# Patient Record
(demographics unavailable — no encounter records)

---

## 2024-12-18 NOTE — PHYSICAL EXAM
[Normal Coordination] : normal coordination [Normal Sensation] : normal sensation [Normal Mood and Affect] : normal mood and affect [Oriented] : oriented [Able to Communicate] : able to communicate [Well Developed] : well developed [Well Nourished] : well nourished [4___] : hamstring 4[unfilled]/5 [Right] : right knee [Lateral] : lateral [[place]] : skyline [AP Standing] : anteroposterior standing [There are no fractures, subluxations or dislocations. No significant abnormalities are seen] : There are no fractures, subluxations or dislocations. No significant abnormalities are seen [] : no extensor lag [TWNoteComboBox7] : flexion 110 degrees

## 2024-12-18 NOTE — HISTORY OF PRESENT ILLNESS
[de-identified] : Patient reports medial knee pain following a challenging hike about 3 weeks ago when she slipped. She is walking with a limp and has been using a brace, ice and takes Aleve without improvement.

## 2024-12-18 NOTE — DISCUSSION/SUMMARY
[Medication Risks Reviewed] : Medication risks reviewed [de-identified] : We discussed formal PT, a home exercise program, ice therapy and the role of NSAIDS for the pain. These modalities will improve the patient's functionality in their activities of daily life.  Risks, benefits and contraindications were discussed.  The patient will follow up in 6 weeks or sooner as needed.

## 2025-01-23 NOTE — PHYSICAL EXAM
[Normal Coordination] : normal coordination [Normal Sensation] : normal sensation [Normal Mood and Affect] : normal mood and affect [Oriented] : oriented [Able to Communicate] : able to communicate [Well Developed] : well developed [Well Nourished] : well nourished [4___] : hamstring 4[unfilled]/5 [Right] : right knee [Lateral] : lateral [[place]] : skyline [AP Standing] : anteroposterior standing [There are no fractures, subluxations or dislocations. No significant abnormalities are seen] : There are no fractures, subluxations or dislocations. No significant abnormalities are seen [] : no extensor lag [TWNoteComboBox7] : flexion 110 degrees

## 2025-01-23 NOTE — DISCUSSION/SUMMARY
[de-identified] : We discussed formal PT, a home exercise program, ice therapy and the role of NSAIDS for the pain. These modalities will improve the patient's functionality in their activities of daily life.  Risks, benefits and contraindications were discussed.  The patient will follow up in 6 weeks or sooner as needed.

## 2025-01-23 NOTE — DATA REVIEWED
[MRI] : MRI [Right] : of the right [Knee] : knee [Report was reviewed and noted in the chart] : The report was reviewed and noted in the chart [I reviewed the films/CD] : I reviewed the films/CD [FreeTextEntry1] : 1. 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 2. Nondisplaced fracture at the medial femoral condyle, with associated bone bruising. 3. Central full-thickness tear of the anterior cruciate ligament. 4. Sprain of the patellar retinaculum. 5. Cartilaginous degenerative changes at the medial and lateral compartments. 6. Suprapatellar joint effusion. 7. Baker's cyst. 8. Mild infrapatellar bursitis.

## 2025-01-23 NOTE — HISTORY OF PRESENT ILLNESS
[de-identified] : Patient reports significant improvement since onset. She has been unable to take the Celebrex due to an unrelated surgical procedure. She has been icing, elevating, resting and using Tylenol since onset.

## 2025-02-26 NOTE — DISCUSSION/SUMMARY
[de-identified] : We discussed formal PT, a home exercise program, ice therapy and the role of NSAIDS for the pain. These modalities will improve the patient's functionality in their activities of daily life.  Risks, benefits and contraindications were discussed.  The patient will follow up in 6 weeks or sooner as needed.

## 2025-02-26 NOTE — HISTORY OF PRESENT ILLNESS
[de-identified] : Patient reports continued improvement since onset. She has developed some lower back pain due to an antalgic gait.

## 2025-02-26 NOTE — PHYSICAL EXAM
[Normal Coordination] : normal coordination [Normal Sensation] : normal sensation [Normal Mood and Affect] : normal mood and affect [Oriented] : oriented [Able to Communicate] : able to communicate [Well Developed] : well developed [Well Nourished] : well nourished [4___] : hamstring 4[unfilled]/5 [Right] : right knee [Lateral] : lateral [[place]] : skyline [AP Standing] : anteroposterior standing [There are no fractures, subluxations or dislocations. No significant abnormalities are seen] : There are no fractures, subluxations or dislocations. No significant abnormalities are seen [] : no extensor lag [TWNoteComboBox7] : flexion 110 degrees

## 2025-06-09 NOTE — PHYSICAL EXAM
[NL (0)] : extension 0 degrees [4___] : quadriceps 4[unfilled]/5 [Left] : left knee [AP] : anteroposterior [Lateral] : lateral [The fracture is in acceptable alignment. There is progression in healing seen] : The fracture is in acceptable alignment. There is progression in healing seen [FreeTextEntry9] : well aligned healing comminuted patella fracture with bony callous formation [] : non-antalgic

## 2025-06-09 NOTE — DISCUSSION/SUMMARY
[de-identified] : We discussed continued bracing in dain knee brace for the next two weeks, repeat x-ray to determine transition to hinged brace in 2 weeks. We discussed ice therapy and the role of NSAIDS for the pain. These modalities will improve the patient's functionality in their activities of daily life.  Risks, benefits and contraindications were discussed.  The patient will follow up in 2 weeks or sooner as needed. Patient given paperwork for temporary handicapped parking pass today

## 2025-06-09 NOTE — HISTORY OF PRESENT ILLNESS
[de-identified] : Patient is 2 weeks s/p fracture, utilizing a cane for ambulation and presents in a knee dain brace

## 2025-06-25 NOTE — DISCUSSION/SUMMARY
[de-identified] : We discussed discontinuing use of knee immobilizer. She will transition to knee sleeve. We discussed formal PT, a home exercise program, ice therapy and the role of NSAIDS for the pain. These modalities will improve the patient's functionality in their activities of daily life.  Risks, benefits and contraindications were discussed.  The patient will follow up in 2 weeks or sooner as needed.

## 2025-06-25 NOTE — PHYSICAL EXAM
[NL (0)] : extension 0 degrees [4___] : quadriceps 4[unfilled]/5 [Left] : left knee [AP] : anteroposterior [Lateral] : lateral [The fracture is in acceptable alignment. There is progression in healing seen] : The fracture is in acceptable alignment. There is progression in healing seen [] : non-antalgic [FreeTextEntry9] : well aligned healing comminuted patella fracture with bony callous formation, improved since prior [TWNoteComboBox7] : flexion 70 degrees

## 2025-07-02 NOTE — DISCUSSION/SUMMARY
[Medication Risks Reviewed] : Medication risks reviewed [Surgical risks reviewed] : Surgical risks reviewed [de-identified] : The risks of the procedure, including but not limited to infection, bleeding, anesthetic complication, neurovascular injury and the possibility of subsequent surgery were discussed; the benefits, non-operative alternatives and expectations of the proposed procedure were also discussed. Post-operative management, the procedure itself and the expected recovery period were discussed at length with the patient. The need for post-operative physical therapy and home exercise regimen, possible bracing and medication management were also discussed. Informed consent was obtained.

## 2025-07-02 NOTE — HISTORY OF PRESENT ILLNESS
[de-identified] : Patient reports a slip and fall at home on a wet floor yesterday. She was evaluated in the ER where xrays were performed and she was placed in a knee immobilizer.

## 2025-07-02 NOTE — PHYSICAL EXAM
[NL (0)] : extension 0 degrees [4___] : quadriceps 4[unfilled]/5 [Left] : left knee [AP] : anteroposterior [Lateral] : lateral [The fracture is in acceptable alignment. There is progression in healing seen] : The fracture is in acceptable alignment. There is progression in healing seen [TWNoteComboBox7] : flexion 70 degrees [Outside films reviewed] : Outside films reviewed [] : no extensor lag [de-identified] : deferred due to known fracture [FreeTextEntry9] : displaced transverse patella fracture

## 2025-07-07 NOTE — HISTORY OF PRESENT ILLNESS
[de-identified] : Lt knee PO, patient reports doing much better, patient reports she took oxy 5 mg every 4 hours, then stopped taking it. 3 days after surgery, also icing every 20 minutes for the first 48 hours, still taking anti-inflammatory. swelling has gone down a lot and lots of improvement, feeling more Mobil. did use arnica on the bruising.

## 2025-07-07 NOTE — PHYSICAL EXAM
[NL (0)] : extension 0 degrees [4___] : quadriceps 4[unfilled]/5 [Left] : left knee [AP] : anteroposterior [Lateral] : lateral [Outside films reviewed] : Outside films reviewed [FreeTextEntry9] : displaced transverse patella fracture [] : not able to do straight leg raise [TWNoteComboBox7] : flexion 30 degrees

## 2025-07-07 NOTE — DISCUSSION/SUMMARY
[de-identified] : dressing changed, wounds clean and dry I reviewed all diagnostic and physical findings, the anatomy and pathology were discussed and the patient understands. All questions were answered and the patient left pleased. asa 81mg f/u Jacqui

## 2025-07-14 NOTE — DISCUSSION/SUMMARY
[Medication Risks Reviewed] : Medication risks reviewed [de-identified] : We discussed formal PT, a home exercise program, ice therapy and the role of NSAIDS for the pain. These modalities will improve the patient's functionality in their activities of daily life.  Risks, benefits and contraindications were discussed.  The patient will follow up in 6 weeks or sooner as needed.

## 2025-07-14 NOTE — PHYSICAL EXAM
[NL (0)] : extension 0 degrees [Left] : left knee [AP] : anteroposterior [Lateral] : lateral [4___] : hamstring 4[unfilled]/5 [No loss of surgical correlation. Bony alignment acceptable. Hardware in appropriate position] : No loss of surgical correlation. Bony alignment acceptable. Hardware in appropriate position [] : able to do straight leg raise [FreeTextEntry3] : no skin abrasions or ulcerations, prineo dressing intact, knee immobilizer in place [FreeTextEntry9] : well placed screws in transverse patella fracture, well aligned and healing without displacement

## 2025-07-24 NOTE — PHYSICAL EXAM
[NL (0)] : extension 0 degrees [4___] : hamstring 4[unfilled]/5 [Left] : left knee [AP] : anteroposterior [Lateral] : lateral [No loss of surgical correlation. Bony alignment acceptable. Hardware in appropriate position] : No loss of surgical correlation. Bony alignment acceptable. Hardware in appropriate position [] : ambulation with cane [FreeTextEntry3] : no skin abrasions or ulcerations, prineo dressing intact, knee immobilizer in place [FreeTextEntry9] : well placed screws in transverse patella fracture, well aligned and healing without displacement, bony callous formation evident [TWNoteComboBox7] : flexion 30 degrees

## 2025-07-24 NOTE — DISCUSSION/SUMMARY
[Medication Risks Reviewed] : Medication risks reviewed [de-identified] : We discussed dain knee brace unlocked to 30 degrees of flexion, formal PT, a home exercise program, ice therapy and the role of NSAIDS for the pain. These modalities will improve the patient's functionality in their activities of daily life.  Risks, benefits and contraindications were discussed.  The patient will follow up in 10-14 days or sooner as needed.